# Patient Record
Sex: MALE | Race: AMERICAN INDIAN OR ALASKA NATIVE
[De-identification: names, ages, dates, MRNs, and addresses within clinical notes are randomized per-mention and may not be internally consistent; named-entity substitution may affect disease eponyms.]

---

## 2018-03-25 ENCOUNTER — HOSPITAL ENCOUNTER (EMERGENCY)
Dept: HOSPITAL 42 - ED | Age: 18
Discharge: HOME | End: 2018-03-25
Payer: MEDICAID

## 2018-03-25 VITALS
SYSTOLIC BLOOD PRESSURE: 122 MMHG | DIASTOLIC BLOOD PRESSURE: 78 MMHG | OXYGEN SATURATION: 99 % | RESPIRATION RATE: 18 BRPM | HEART RATE: 78 BPM

## 2018-03-25 VITALS — TEMPERATURE: 98.8 F
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
## 2018-03-25 DIAGNOSIS — S01.511A: Primary | ICD-10-CM
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
## 2018-06-24 ENCOUNTER — HOSPITAL ENCOUNTER (INPATIENT)
Dept: HOSPITAL 42 - ED | Age: 18
LOS: 4 days | Discharge: HOME | DRG: 560 | End: 2018-06-28
Attending: INTERNAL MEDICINE | Admitting: INTERNAL MEDICINE
Payer: COMMERCIAL

## 2018-06-24 VITALS — BODY MASS INDEX: 21.1 KG/M2

## 2018-06-24 DIAGNOSIS — I31.9: ICD-10-CM

## 2018-06-24 DIAGNOSIS — K59.00: ICD-10-CM

## 2018-06-24 DIAGNOSIS — F10.129: ICD-10-CM

## 2018-06-24 DIAGNOSIS — E87.0: ICD-10-CM

## 2018-06-24 DIAGNOSIS — F11.10: ICD-10-CM

## 2018-06-24 DIAGNOSIS — M62.82: ICD-10-CM
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
## 2018-06-24 DIAGNOSIS — S73.004A: Primary | ICD-10-CM

## 2018-06-24 DIAGNOSIS — S00.81XA: ICD-10-CM

## 2018-06-24 DIAGNOSIS — D64.9: ICD-10-CM
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
## 2018-06-24 DIAGNOSIS — F12.10: ICD-10-CM
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
## 2018-06-24 LAB
ALBUMIN SERPL-MCNC: 4.8 G/DL (ref 3–4.8)
ALBUMIN/GLOB SERPL: 1.5 {RATIO} (ref 1.1–1.8)
ALT SERPL-CCNC: 30 U/L (ref 7–56)
APAP SERPL-MCNC: < 10 UG/ML (ref 10–20)
APPEARANCE UR: CLEAR
APTT BLD: 27.6 SECONDS (ref 25.1–36.5)
AST SERPL-CCNC: 35 U/L (ref 17–59)
BACTERIA #/AREA URNS HPF: (no result) /[HPF]
BASOPHILS # BLD AUTO: 0.03 K/MM3 (ref 0–2)
BASOPHILS NFR BLD: 0.4 % (ref 0–3)
BILIRUB UR-MCNC: NEGATIVE MG/DL
BUN SERPL-MCNC: 13 MG/DL (ref 7–21)
BUN SERPL-MCNC: 13 MG/DL (ref 7–21)
CALCIUM SERPL-MCNC: 8.6 MG/DL (ref 8.4–10.5)
CALCIUM SERPL-MCNC: 9.3 MG/DL (ref 8.4–10.5)
CK MB SERPL-MCNC: 2.6 NG/ML (ref 0–3.6)
COLOR UR: (no result)
EOSINOPHIL # BLD: 0.1 10*3/UL (ref 0–0.7)
EOSINOPHIL NFR BLD: 0.8 % (ref 1.5–5)
EPI CELLS #/AREA URNS HPF: (no result) /HPF (ref 0–5)
ERYTHROCYTE [DISTWIDTH] IN BLOOD BY AUTOMATED COUNT: 13 % (ref 11.5–14.5)
GFR NON-AFRICAN AMERICAN: > 60
GFR NON-AFRICAN AMERICAN: > 60
GLUCOSE UR STRIP-MCNC: NEGATIVE MG/DL
GRANULOCYTES # BLD: 3.47 10*3/UL (ref 1.4–6.5)
GRANULOCYTES NFR BLD: 44.2 % (ref 50–68)
HGB BLD-MCNC: 14.3 G/DL (ref 14–18)
INR PPP: 1.12 (ref 0.93–1.08)
LEUKOCYTE ESTERASE UR-ACNC: NEGATIVE LEU/UL
LYMPHOCYTES # BLD: 4 10*3/UL (ref 1.2–3.4)
LYMPHOCYTES NFR BLD AUTO: 50.7 % (ref 22–35)
MCH RBC QN AUTO: 31 PG (ref 25–35)
MCHC RBC AUTO-ENTMCNC: 34.9 G/DL (ref 31–37)
MCV RBC AUTO: 88.9 FL (ref 80–105)
MONOCYTES # BLD AUTO: 0.3 10*3/UL (ref 0.1–0.6)
MONOCYTES NFR BLD: 3.9 % (ref 1–6)
PH UR STRIP: 6.5 [PH] (ref 4.7–8)
PLATELET # BLD: 246 10^3/UL (ref 120–450)
PMV BLD AUTO: 9.1 FL (ref 7–11)
PROT UR STRIP-MCNC: NEGATIVE MG/DL
PROTHROMBIN TIME: 12.9 SECONDS (ref 9.4–12.5)
RBC # BLD AUTO: 4.61 10^6/UL (ref 3.5–6.1)
RBC # UR STRIP: (no result) /UL
RBC #/AREA URNS HPF: (no result) /HPF (ref 0–2)
SALICYLATES SERPL-MCNC: < 1 MG/DL (ref 2–20)
SP GR UR STRIP: <= 1.005 (ref 1–1.03)
TROPONIN I SERPL-MCNC: < 0.01 NG/ML
TROPONIN I SERPL-MCNC: < 0.01 NG/ML
UROBILINOGEN UR STRIP-ACNC: 0.2 E.U./DL
WBC # BLD AUTO: 7.9 10^3/UL (ref 4.5–11)
WBC #/AREA URNS HPF: (no result) /HPF (ref 0–6)

## 2018-06-24 PROCEDURE — 0QS6XZZ REPOSITION RIGHT UPPER FEMUR, EXTERNAL APPROACH: ICD-10-PCS

## 2018-06-24 RX ADMIN — BACITRACIN ZINC NEOMYCIN SULFATE POLYMYXIN B SULFATE SCH APPLIC: 400; 3.5; 5 OINTMENT TOPICAL at 17:50

## 2018-06-24 RX ADMIN — OXYCODONE HYDROCHLORIDE AND ACETAMINOPHEN PRN TAB: 2.5; 325 TABLET ORAL at 22:54

## 2018-06-24 NOTE — RAD
PROCEDURE:  Pelvis 



HISTORY:

Postreduction 



COMPARISON:

Comparison made with prior pelvic radiographs earlier same day



FINDINGS:



BONES:

Interval reduction previously noted dislocated right femoral head 



IMPRESSION:

Status post reduction previously noted dislocated right femoral head

## 2018-06-24 NOTE — CT
PROCEDURE:  CT of the Right Hip.



HISTORY:

Right hip injury







COMPARISON:

None available.



TECHNIQUE:

Contiguous axial images of the right hip were obtained. Coronal and 

sagittal reformats were generated.



This CT exam was performed using one or more of the following dose 

reduction techniques: Automated exposure control, adjustment of the 

mA and/or kV according to patient size, and/or use of iterative 

reconstruction technique.



Radiation dose: Total DLP = 193.52 mGy-cm 



FINDINGS:



BONES:

The femoral head is posterior superior and somewhat laterally 

dislocated with respect to the right acetabulum. The femoral head 

appears to be rotated with the greater trochanter anteriorly 

oriented. There are several tiny bony fragments seen adjacent to the 

posterior margin of the acetabulum and anterior to the femoral head 

likely arising posterior margin of the acetabular rim and possibly a 

santy or two of bone from the femoral head. .  



RIGHT HIP JOINT:

As above



SOFT TISSUES:

There appears to be some hyperdense material possibly hemorrhagic 

fluid within the acetabulum.  A small amount of vacuum phenomena is 

also present. 



IMPRESSION:

The femoral head is posterior superior and somewhat laterally 

dislocated with respect to the right acetabulum. The femoral head 

appears to be rotated with the greater trochanter anteriorly 

oriented. There are several tiny bony fragments seen adjacent to the 

posterior margin of the acetabulum and anterior to the femoral head 

likely arising posterior margin of the acetabular rim and possibly a 

santy or two of bone from the femoral head

## 2018-06-24 NOTE — CT
PROCEDURE:  CT Chest, Abdomen and Pelvis with intravenous contrast



HISTORY:

Trauma 



COMPARISON:

None.



TECHNIQUE:

IV dose administered: 150 cc Omnipaque 350 



Radiation dose:



Total exam DLP = 491.34 mGy-cm.



This CT exam was performed using one or more of the following dose 

reduction techniques: Automated exposure control, adjustment of the 

mA and/or kV according to patient size, and/or use of iterative 

reconstruction technique. . . 



Note that the examination is limited due to patient rotation and side 

bending limiting evaluation. 



FINDINGS:



CT CHEST WITH CONTRAST:



LUNGS:

There are no focal areas of consolidation.  No evidence of masses or 

nodules.



MEDIASTINUM:

Heart size normal. No evidence of pericardial effusion. Ascending 

thoracic aorta measures approximately 2.4 cm and descending thoracic 

aorta measures approximately 1.9 cm. No evidence of aortic 

dissection. Three-vessel arch. 



Pulmonary trunk measures approximately 2.3 cm. Central airways 

midline and patent. No evidence of large central endoluminal lesions. 

. 



LYMPH NODES:

No significant mediastinal or hilar adenopathy.



PLEURA:

No significant pleural effusion or evidence of pneumothorax.



BONES:

Unremarkable.



OTHER FINDINGS:

None.



CT ABDOMEN AND PELVIS:



LIVER:

Liver appears intact and exhibits normal size.  Moderate fatty 

hepatic infiltration. .  No obvious hepatic mass or collection.  

Portal and splenic veins are opacified. 



GALLBLADDER AND BILE DUCTS:

Gallbladder physiologically distended. No evidence of intraluminal 

gallbladder calculi. . 



PANCREAS:

Unremarkable. No gross lesion or ductal dilatation.



SPLEEN:

Spleen appears intact. . 



ADRENALS:

There are no adrenal lesions. 



KIDNEYS AND URETERS:

Kidneys that demonstrate symmetric nephrograms. Kidneys appear 

intact. No evidence of nephrolithiasis or hydronephrosis. 



VASCULATURE:

Unremarkable. No aortic aneurysm. 



BOWEL:

Evaluation of the bowel is somewhat limited due to the lack of oral 

contrast material. Additionally, study is further limited by multiple 

collapsed loops of small bowel. What is felt to represent several of 

small fluid-filled loops of small bowel within the pelvis are 

present. This is not felt to represent free intraperitoneal fluid or 

hemorrhage with Hounsfield units in the low 20s the likely 

representing sock mesenteric this. . 



Most of the large bowel is also relatively collapsed therefore 

difficult to evaluate. 



APPENDIX:

Appendix is not seen with any certainty on this exam. 



PERITONEUM:

Unremarkable. No free fluid. No free air. 



LYMPH NODES:

Unremarkable. No enlarged lymph nodes. 



BLADDER:

Urinary bladder physiologically distended.  No evidence of 

intraluminal urinary bladder calculi. 



REPRODUCTIVE:

Unremarkable. 



BONES:

Re- demonstrated is dislocation of the right femoral head which has 

been described in detail on concurrent CT scan of the right. Several 

tiny bone fragments of the bulk of which arise presumably from the 

posterior margin of the acetabular rim and possibly a flat vertebral 

bone from the femoral head itself.  Please refer to concurrent CT 

scan of the hip corresponding report for additional details 



The remaining osseous structures appear intact. 



OTHER FINDINGS:

None.



IMPRESSION:

No acute intra abdominal or intrathoracic posttraumatic sequela. 



Dislocation right hip as detailed above. Please refer to concurrent 

CT scan of the right hip and corresponding report for additional 

details.

## 2018-06-24 NOTE — CT
PROCEDURE:  CT Cervical Spine without contrast



HISTORY:

trauma



COMPARISON:

None available.



TECHNIQUE:

Axial computed tomography images were obtained of the cervical spine 

without the use of intravenous contrast. Coronal and sagittal 

reformatted images were created and reviewed. Note that the 

examination is limited by side bending to the left with component of 

of rotation of the head to the left as well. 



Radiation dose: 



Total exam DLP = the mGy-cm.



This CT exam was performed using one or more of the following dose 

reduction techniques: Automated exposure control, adjustment of the 

mA and/or kV according to patient size, and/or use of iterative 

reconstruction technique.



FINDINGS:



VERTEBRAE:

No fracture. Normal alignment. No destructive bony lesion.



DISCS/SPINAL CANAL/NEURAL FORAMINA:

No significant central canal or neural foraminal stenosis. Discs 

heights are grossly preserved.



PARASPINAL SOFT TISSUES:

Unremarkable. 



OTHER FINDINGS:

None.



IMPRESSION:

No evidence of acute displaced or compression fracture deformities.



No disc herniation or significant disc bulges. Central canal and exit 

foramina adequate.

## 2018-06-24 NOTE — CT
PROCEDURE:  CT HEAD WITHOUT CONTRAST.



HISTORY:

Trauma 



COMPARISON:

Comparison made with CT scan of the brain dated 03/25/2018. 



TECHNIQUE:

Axial computed tomography images were obtained through the head/brain 

without intravenous contrast.  



Radiation dose:



Total exam DLP = 989.44 mGy-cm.



This CT exam was performed using one or more of the following dose 

reduction techniques: Automated exposure control, adjustment of the 

mA and/or kV according to patient size, and/or use of iterative 

reconstruction technique.



FINDINGS:



HEMORRHAGE:

No intracranial hemorrhage. 



BRAIN:

No mass effect or edema.  No atrophy or chronic microvascular 

ischemic changes.



VENTRICLES:

Unremarkable. No hydrocephalus. 



CALVARIUM:

Unremarkable. Mild right temporoparietal and frontal scalp swelling. 



PARANASAL SINUSES:

There is a medium to large size mucous retention cyst right maxillary 

antrum the the cup



MASTOID AIR CELLS:

Unremarkable as visualized. No inflammatory changes.



OTHER FINDINGS:

None.



IMPRESSION:

Normal CT of the Head.

## 2018-06-24 NOTE — CARD
--------------- APPROVED REPORT --------------





EKG Measurement

Heart [id number]

[id number]

[id number]

[id number]

[id number]



<Conclusion>

Normal sinus rhythm

Rightward axis

ST elevation, consider 

Early Repolarization

Vs

Early Pericarditis

Abnormal ECG

## 2018-06-24 NOTE — RAD
PROCEDURE:  Pelvis right



HISTORY:

hip injury



COMPARISON:

Correlation made with concurrent CT scan of the pelvis



TECHNIQUE:

Two views of the pelvis performed



FINDINGS:

There is posterior dislocation of the right femoral head with respect 

to the acetabulum small bony fracture fragments seen on CT scan not 

appreciated on this study please refer to that report for additional 

information. 



IMPRESSION:

Posterior dislocation right femoral head as above.  Fracture 

fragments not appreciated on this study compared concurrent CT scan 

of the pelvis
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
## 2018-06-24 NOTE — RAD
HISTORY:

trauma  



COMPARISON:

None



FINDINGS:



LUNGS:

No active pulmonary disease.



PLEURA:

No significant pleural effusion identified, no pneumothorax apparent.



CARDIOVASCULAR:

Normal.



OSSEOUS STRUCTURES:

No significant abnormalities.



VISUALIZED UPPER ABDOMEN:

Normal.



OTHER FINDINGS:

None.



IMPRESSION:

No active disease.
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
## 2018-06-25 LAB
BASOPHILS # BLD AUTO: 0.01 K/MM3 (ref 0–2)
BASOPHILS NFR BLD: 0.1 % (ref 0–3)
BUN SERPL-MCNC: 13 MG/DL (ref 7–21)
CALCIUM SERPL-MCNC: 8.8 MG/DL (ref 8.4–10.5)
CK MB SERPL-MCNC: 22.3 NG/ML (ref 0–3.6)
CK MB%: 0.4 % (ref 2.5–3)
EOSINOPHIL # BLD: 0.3 10*3/UL (ref 0–0.7)
EOSINOPHIL NFR BLD: 3.6 % (ref 1.5–5)
ERYTHROCYTE [DISTWIDTH] IN BLOOD BY AUTOMATED COUNT: 13.3 % (ref 11.5–14.5)
GFR NON-AFRICAN AMERICAN: > 60
GRANULOCYTES # BLD: 3.31 10*3/UL (ref 1.4–6.5)
GRANULOCYTES NFR BLD: 47.1 % (ref 50–68)
HGB BLD-MCNC: 12.1 G/DL (ref 14–18)
LYMPHOCYTES # BLD: 2.6 10*3/UL (ref 1.2–3.4)
LYMPHOCYTES NFR BLD AUTO: 37.4 % (ref 22–35)
MCH RBC QN AUTO: 30.4 PG (ref 25–35)
MCHC RBC AUTO-ENTMCNC: 34.1 G/DL (ref 31–37)
MCV RBC AUTO: 89.2 FL (ref 80–105)
MONOCYTES # BLD AUTO: 0.8 10*3/UL (ref 0.1–0.6)
MONOCYTES NFR BLD: 11.8 % (ref 1–6)
PLATELET # BLD: 193 10^3/UL (ref 120–450)
PMV BLD AUTO: 9.8 FL (ref 7–11)
RBC # BLD AUTO: 3.98 10^6/UL (ref 3.5–6.1)
WBC # BLD AUTO: 7 10^3/UL (ref 4.5–11)

## 2018-06-25 RX ADMIN — OXYCODONE HYDROCHLORIDE AND ACETAMINOPHEN PRN TAB: 2.5; 325 TABLET ORAL at 08:23

## 2018-06-25 RX ADMIN — OXYCODONE HYDROCHLORIDE AND ACETAMINOPHEN PRN TAB: 2.5; 325 TABLET ORAL at 21:50

## 2018-06-25 RX ADMIN — BACITRACIN ZINC NEOMYCIN SULFATE POLYMYXIN B SULFATE SCH APPLIC: 400; 3.5; 5 OINTMENT TOPICAL at 10:29

## 2018-06-25 RX ADMIN — OXYCODONE HYDROCHLORIDE AND ACETAMINOPHEN PRN TAB: 2.5; 325 TABLET ORAL at 15:23
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
## 2018-06-25 NOTE — CARD
--------------- APPROVED REPORT --------------





EKG Measurement

Heart [id number]

PA [id number]

[id number]

[id number]

[id number]



<Conclusion>

Normal sinus rhythm

Voltage criteria for left ventricular hypertrophy

Early repolarization

Abnormal ECG
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
## 2018-06-25 NOTE — CARD
--------------- APPROVED REPORT --------------





EXAM: Two-dimensional and M-mode echocardiogram with Doppler and 

color Doppler.



INDICATION

Abnormal EKG/Arrhythmia 



2D DIMENSIONS 

Left Atrium (2D)3.9   (1.6-4.0cm)IVSd1.1   (0.7-1.1cm)

LVDd4.3   (3.9-5.9cm)PWd1.2   (0.7-1.1cm)

LVDs2.7   (2.5-4.0cm)FS (%) 37.4   %

LVEF (%)67.8   (>50%)



M-Mode DIMENSIONS 

Aortic Root2.30   (2.2-3.7cm)Aortic Cusp Exc.1.80   (1.5-2.0cm)



Aortic Valve

AoV Peak Gwhfdito190.0cm/Dylon Peak GR.9mmHg



Mitral Valve

MV E Bobigsbr93.3cm/sMV A Ubpcatjs25.2cm/sE/A ratio1.9



TDI

E/Lateral E'0.0E/Medial E'0.0



Tricuspid Valve

TR Peak Pcjqnmgj805is/sRAP [id number] Peak Gr.20mmHg

[id number]



 LEFT VENTRICLE 

The left ventricle is normal size. There is normal left ventricular 

wall thickness. The left ventricular function is normal. The left 

ventricular ejection fraction is within the normal range. There is 

normal LV segmental wall motion. The left ventricular diastolic 

function is normal.



 RIGHT VENTRICLE 

The right ventricle is normal size. There is normal right ventricular 

wall thickness. The right ventricular systolic function is normal.



 ATRIA 

The left atrium size is normal. The right atrium size is normal.



 AORTIC VALVE 

The aortic valve is normal in structure. No aortic regurgitation is 

present. There is no aortic valvular stenosis. 



 MITRAL VALVE 

The mitral valve is normal in structure. There is no mitral valve 

regurgitation noted. There is no mitral valve stenosis.



 TRICUSPID VALVE 

There is mild tricuspid regurgitation.



 GREAT VESSELS 

The aortic root is normal in size. The IVC is normal in size and 

collapses >50% with inspiration.



<Conclusion>

The left ventricle is normal size.

There is normal left ventricular wall thickness.

The left ventricular function is normal.

The left ventricular ejection fraction is within the normal range.

There is normal LV segmental wall motion.

The left ventricular diastolic function is normal.

There is mild tricuspid regurgitation.
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
## 2018-06-26 LAB
APPEARANCE UR: CLEAR
BASOPHILS # BLD AUTO: 0.02 K/MM3 (ref 0–2)
BASOPHILS NFR BLD: 0.3 % (ref 0–3)
BILIRUB UR-MCNC: NEGATIVE MG/DL
BUN SERPL-MCNC: 9 MG/DL (ref 7–21)
CALCIUM SERPL-MCNC: 8.6 MG/DL (ref 8.4–10.5)
CK MB SERPL-MCNC: 6.2 NG/ML (ref 0–3.6)
CK MB%: 0.1 % (ref 2.5–3)
COLOR UR: COLORLESS
EOSINOPHIL # BLD: 0.2 10*3/UL (ref 0–0.7)
EOSINOPHIL NFR BLD: 3.3 % (ref 1.5–5)
ERYTHROCYTE [DISTWIDTH] IN BLOOD BY AUTOMATED COUNT: 13.1 % (ref 11.5–14.5)
GFR NON-AFRICAN AMERICAN: > 60
GLUCOSE UR STRIP-MCNC: NEGATIVE MG/DL
GRANULOCYTES # BLD: 2.27 10*3/UL (ref 1.4–6.5)
GRANULOCYTES NFR BLD: 37.9 % (ref 50–68)
HGB BLD-MCNC: 11.3 G/DL (ref 14–18)
LEUKOCYTE ESTERASE UR-ACNC: NEGATIVE LEU/UL
LYMPHOCYTES # BLD: 2.7 10*3/UL (ref 1.2–3.4)
LYMPHOCYTES NFR BLD AUTO: 45.5 % (ref 22–35)
MCH RBC QN AUTO: 30.1 PG (ref 25–35)
MCHC RBC AUTO-ENTMCNC: 33.4 G/DL (ref 31–37)
MCV RBC AUTO: 89.9 FL (ref 80–105)
MONOCYTES # BLD AUTO: 0.8 10*3/UL (ref 0.1–0.6)
MONOCYTES NFR BLD: 13 % (ref 1–6)
PH UR STRIP: 6.5 [PH] (ref 4.7–8)
PLATELET # BLD: 168 10^3/UL (ref 120–450)
PMV BLD AUTO: 9.8 FL (ref 7–11)
PROT UR STRIP-MCNC: NEGATIVE MG/DL
RBC # BLD AUTO: 3.76 10^6/UL (ref 3.5–6.1)
RBC # UR STRIP: NEGATIVE /UL
SP GR UR STRIP: <= 1.005 (ref 1–1.03)
UROBILINOGEN UR STRIP-ACNC: 0.2 E.U./DL
WBC # BLD AUTO: 6 10^3/UL (ref 4.5–11)

## 2018-06-26 RX ADMIN — OXYCODONE HYDROCHLORIDE AND ACETAMINOPHEN PRN TAB: 2.5; 325 TABLET ORAL at 13:53

## 2018-06-26 RX ADMIN — BACITRACIN ZINC NEOMYCIN SULFATE POLYMYXIN B SULFATE SCH APPLIC: 400; 3.5; 5 OINTMENT TOPICAL at 10:29
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
## 2018-06-27 VITALS — OXYGEN SATURATION: 98 %

## 2018-06-27 LAB
ALBUMIN SERPL-MCNC: 3.4 G/DL (ref 3–4.8)
ALBUMIN/GLOB SERPL: 1.2 {RATIO} (ref 1.1–1.8)
ALT SERPL-CCNC: 48 U/L (ref 7–56)
AST SERPL-CCNC: 67 U/L (ref 17–59)
BASOPHILS # BLD AUTO: 0.01 K/MM3 (ref 0–2)
BASOPHILS NFR BLD: 0.2 % (ref 0–3)
BUN SERPL-MCNC: 7 MG/DL (ref 7–21)
CALCIUM SERPL-MCNC: 9.1 MG/DL (ref 8.4–10.5)
CK MB SERPL-MCNC: 2 NG/ML (ref 0–3.6)
EOSINOPHIL # BLD: 0.2 10*3/UL (ref 0–0.7)
EOSINOPHIL NFR BLD: 2.8 % (ref 1.5–5)
ERYTHROCYTE [DISTWIDTH] IN BLOOD BY AUTOMATED COUNT: 12.9 % (ref 11.5–14.5)
GFR NON-AFRICAN AMERICAN: > 60
GRANULOCYTES # BLD: 2.88 10*3/UL (ref 1.4–6.5)
GRANULOCYTES NFR BLD: 53 % (ref 50–68)
HGB BLD-MCNC: 12.8 G/DL (ref 14–18)
LYMPHOCYTES # BLD: 1.9 10*3/UL (ref 1.2–3.4)
LYMPHOCYTES NFR BLD AUTO: 34.8 % (ref 22–35)
MCH RBC QN AUTO: 30.8 PG (ref 25–35)
MCHC RBC AUTO-ENTMCNC: 34.7 G/DL (ref 31–37)
MCV RBC AUTO: 88.9 FL (ref 80–105)
MONOCYTES # BLD AUTO: 0.5 10*3/UL (ref 0.1–0.6)
MONOCYTES NFR BLD: 9.2 % (ref 1–6)
PLATELET # BLD: 169 10^3/UL (ref 120–450)
PMV BLD AUTO: 9.9 FL (ref 7–11)
RBC # BLD AUTO: 4.15 10^6/UL (ref 3.5–6.1)
WBC # BLD AUTO: 5.4 10^3/UL (ref 4.5–11)

## 2018-06-27 RX ADMIN — BACITRACIN ZINC NEOMYCIN SULFATE POLYMYXIN B SULFATE SCH APPLIC: 400; 3.5; 5 OINTMENT TOPICAL at 09:51

## 2018-06-27 RX ADMIN — OXYCODONE HYDROCHLORIDE AND ACETAMINOPHEN PRN TAB: 2.5; 325 TABLET ORAL at 20:20
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
## 2018-06-28 VITALS
SYSTOLIC BLOOD PRESSURE: 111 MMHG | HEART RATE: 52 BPM | DIASTOLIC BLOOD PRESSURE: 65 MMHG | RESPIRATION RATE: 20 BRPM | TEMPERATURE: 97.8 F

## 2018-06-28 LAB
ALBUMIN SERPL-MCNC: 2.9 G/DL (ref 3–4.8)
ALBUMIN/GLOB SERPL: 1.1 {RATIO} (ref 1.1–1.8)
ALT SERPL-CCNC: 37 U/L (ref 7–56)
AST SERPL-CCNC: 42 U/L (ref 17–59)
BASOPHILS # BLD AUTO: 0.01 K/MM3 (ref 0–2)
BASOPHILS NFR BLD: 0.2 % (ref 0–3)
BUN SERPL-MCNC: 7 MG/DL (ref 7–21)
CALCIUM SERPL-MCNC: 9 MG/DL (ref 8.4–10.5)
CK MB SERPL-MCNC: 0.9 NG/ML (ref 0–3.6)
EOSINOPHIL # BLD: 0.3 10*3/UL (ref 0–0.7)
EOSINOPHIL NFR BLD: 4.8 % (ref 1.5–5)
ERYTHROCYTE [DISTWIDTH] IN BLOOD BY AUTOMATED COUNT: 12.8 % (ref 11.5–14.5)
GFR NON-AFRICAN AMERICAN: > 60
GRANULOCYTES # BLD: 3.06 10*3/UL (ref 1.4–6.5)
GRANULOCYTES NFR BLD: 52 % (ref 50–68)
HGB BLD-MCNC: 11.7 G/DL (ref 14–18)
LYMPHOCYTES # BLD: 2 10*3/UL (ref 1.2–3.4)
LYMPHOCYTES NFR BLD AUTO: 33.3 % (ref 22–35)
MCH RBC QN AUTO: 30.8 PG (ref 25–35)
MCHC RBC AUTO-ENTMCNC: 34.4 G/DL (ref 31–37)
MCV RBC AUTO: 89.5 FL (ref 80–105)
MONOCYTES # BLD AUTO: 0.6 10*3/UL (ref 0.1–0.6)
MONOCYTES NFR BLD: 9.7 % (ref 1–6)
PLATELET # BLD: 152 10^3/UL (ref 120–450)
PMV BLD AUTO: 9.5 FL (ref 7–11)
RBC # BLD AUTO: 3.8 10^6/UL (ref 3.5–6.1)
WBC # BLD AUTO: 5.9 10^3/UL (ref 4.5–11)

## 2018-06-28 RX ADMIN — OXYCODONE HYDROCHLORIDE AND ACETAMINOPHEN PRN TAB: 2.5; 325 TABLET ORAL at 06:03

## 2018-06-28 RX ADMIN — POLYETHYLENE GLYCOL 3350 SCH GM: 17 POWDER, FOR SOLUTION ORAL at 09:51

## 2018-06-28 RX ADMIN — POLYETHYLENE GLYCOL 3350 SCH: 17 POWDER, FOR SOLUTION ORAL at 17:45

## 2018-06-28 RX ADMIN — BACITRACIN ZINC NEOMYCIN SULFATE POLYMYXIN B SULFATE SCH APPLIC: 400; 3.5; 5 OINTMENT TOPICAL at 09:52

## 2018-06-28 RX ADMIN — OXYCODONE HYDROCHLORIDE AND ACETAMINOPHEN PRN TAB: 2.5; 325 TABLET ORAL at 09:50
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
## 2018-06-28 NOTE — RAD
HISTORY:

constipation, abdominal pain  



COMPARISON:

No prior.



FINDINGS:



BOWEL:

Nonobstructive bowel gas pattern. No abnormal intra-abdominal 

calcifications or prominent free intraperitoneal gas. 



BONES:

Normal.



OTHER FINDINGS:

None.



IMPRESSION:

Unremarkable abdomen KUB radiograph.
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
## 2018-09-05 ENCOUNTER — HOSPITAL ENCOUNTER (EMERGENCY)
Dept: HOSPITAL 42 - ED | Age: 18
Discharge: HOME | End: 2018-09-05
Payer: MEDICAID

## 2018-09-05 VITALS — OXYGEN SATURATION: 99 % | HEART RATE: 64 BPM | DIASTOLIC BLOOD PRESSURE: 72 MMHG | SYSTOLIC BLOOD PRESSURE: 109 MMHG

## 2018-09-05 VITALS — BODY MASS INDEX: 20.9 KG/M2

## 2018-09-05 VITALS — TEMPERATURE: 97.6 F | RESPIRATION RATE: 18 BRPM

## 2018-09-05 DIAGNOSIS — J02.9: Primary | ICD-10-CM

## 2018-09-05 DIAGNOSIS — F17.210: ICD-10-CM

## 2018-09-05 DIAGNOSIS — R59.0: ICD-10-CM

## 2018-09-05 LAB
ALBUMIN SERPL-MCNC: 4.5 G/DL (ref 3–4.8)
ALBUMIN/GLOB SERPL: 1.2 {RATIO} (ref 1.1–1.8)
ALT SERPL-CCNC: 28 U/L (ref 7–56)
AST SERPL-CCNC: 35 U/L (ref 17–59)
BASOPHILS # BLD AUTO: 0.02 K/MM3 (ref 0–2)
BASOPHILS NFR BLD: 0.2 % (ref 0–3)
BUN SERPL-MCNC: 11 MG/DL (ref 7–21)
CALCIUM SERPL-MCNC: 9.5 MG/DL (ref 8.4–10.5)
CK MB SERPL-MCNC: 0.4 NG/ML (ref 0–3.6)
EOSINOPHIL # BLD: 0 10*3/UL (ref 0–0.7)
EOSINOPHIL NFR BLD: 0.2 % (ref 1.5–5)
ERYTHROCYTE [DISTWIDTH] IN BLOOD BY AUTOMATED COUNT: 13.2 % (ref 11.5–14.5)
GFR NON-AFRICAN AMERICAN: > 60
GRANULOCYTES # BLD: 5.49 10*3/UL (ref 1.4–6.5)
GRANULOCYTES NFR BLD: 65.5 % (ref 50–68)
HGB BLD-MCNC: 15.3 G/DL (ref 14–18)
LYMPHOCYTES # BLD: 1.4 10*3/UL (ref 1.2–3.4)
LYMPHOCYTES NFR BLD AUTO: 17 % (ref 22–35)
MCH RBC QN AUTO: 30.5 PG (ref 25–35)
MCHC RBC AUTO-ENTMCNC: 34 G/DL (ref 31–37)
MCV RBC AUTO: 89.6 FL (ref 80–105)
MONOCYTES # BLD AUTO: 1.4 10*3/UL (ref 0.1–0.6)
MONOCYTES NFR BLD: 17.1 % (ref 1–6)
PLATELET # BLD: 191 10^3/UL (ref 120–450)
PMV BLD AUTO: 9.6 FL (ref 7–11)
RBC # BLD AUTO: 5.02 10^6/UL (ref 3.5–6.1)
WBC # BLD AUTO: 8.4 10^3/UL (ref 4.5–11)

## 2018-09-05 PROCEDURE — 96361 HYDRATE IV INFUSION ADD-ON: CPT

## 2018-09-05 PROCEDURE — 82550 ASSAY OF CK (CPK): CPT

## 2018-09-05 PROCEDURE — 83735 ASSAY OF MAGNESIUM: CPT

## 2018-09-05 PROCEDURE — 96375 TX/PRO/DX INJ NEW DRUG ADDON: CPT

## 2018-09-05 PROCEDURE — 96374 THER/PROPH/DIAG INJ IV PUSH: CPT

## 2018-09-05 PROCEDURE — 87430 STREP A AG IA: CPT

## 2018-09-05 PROCEDURE — 70450 CT HEAD/BRAIN W/O DYE: CPT

## 2018-09-05 PROCEDURE — 99284 EMERGENCY DEPT VISIT MOD MDM: CPT

## 2018-09-05 PROCEDURE — 87070 CULTURE OTHR SPECIMN AEROBIC: CPT

## 2018-09-05 PROCEDURE — 80053 COMPREHEN METABOLIC PANEL: CPT

## 2018-09-05 PROCEDURE — 82553 CREATINE MB FRACTION: CPT

## 2018-09-05 PROCEDURE — 85025 COMPLETE CBC W/AUTO DIFF WBC: CPT

## 2018-09-05 NOTE — CT
Date of service: 



09/05/2018



PROCEDURE:  CT HEAD WITHOUT CONTRAST.



HISTORY:

frontal headache x 4 days,



COMPARISON:

None available.



TECHNIQUE:

Axial computed tomography images were obtained through the head/brain 

without intravenous contrast.  



Radiation dose:



Total exam DLP = 840 mGy-cm.



This CT exam was performed using one or more of the following dose 

reduction techniques: Automated exposure control, adjustment of the 

mA and/or kV according to patient size, and/or use of iterative 

reconstruction technique.



FINDINGS:



HEMORRHAGE:

No intracranial hemorrhage. 



BRAIN:

No mass effect or edema.  No atrophy or chronic microvascular 

ischemic changes.



VENTRICLES:

Unremarkable. No hydrocephalus. 



CALVARIUM:

Unremarkable.



PARANASAL SINUSES:

Unremarkable as visualized. No significant inflammatory changes.



MASTOID AIR CELLS:

Unremarkable as visualized. No inflammatory changes.



OTHER FINDINGS:

None.



IMPRESSION:

No acute findings
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
## 2018-10-31 ENCOUNTER — HOSPITAL ENCOUNTER (EMERGENCY)
Dept: HOSPITAL 42 - ED | Age: 18
Discharge: HOME | End: 2018-10-31
Payer: MEDICAID

## 2018-10-31 VITALS
DIASTOLIC BLOOD PRESSURE: 79 MMHG | TEMPERATURE: 98.3 F | SYSTOLIC BLOOD PRESSURE: 132 MMHG | OXYGEN SATURATION: 98 % | HEART RATE: 72 BPM

## 2018-10-31 VITALS — BODY MASS INDEX: 20.9 KG/M2

## 2018-10-31 VITALS — RESPIRATION RATE: 18 BRPM

## 2018-10-31 DIAGNOSIS — M77.8: Primary | ICD-10-CM
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
## 2018-10-31 NOTE — RAD
Date of service: 



10/31/2018



PROCEDURE:  Right Wrist Radiographs.







HISTORY:

medial wrist pain



COMPARISON:

None.



FINDINGS:



BONES:

Normal. No fracture.



JOINTS:

Normal. No dislocation. 



SOFT TISSUES:

Normal. 



OTHER FINDINGS:

None.



IMPRESSION:

Normal right wrist radiographs.

## 2019-04-28 ENCOUNTER — HOSPITAL ENCOUNTER (EMERGENCY)
Dept: HOSPITAL 42 - ED | Age: 19
Discharge: HOME | End: 2019-04-28
Payer: MEDICAID

## 2019-04-28 VITALS — RESPIRATION RATE: 16 BRPM

## 2019-04-28 VITALS — TEMPERATURE: 98.6 F

## 2019-04-28 VITALS — DIASTOLIC BLOOD PRESSURE: 56 MMHG | SYSTOLIC BLOOD PRESSURE: 134 MMHG | OXYGEN SATURATION: 99 % | HEART RATE: 62 BPM

## 2019-04-28 VITALS — BODY MASS INDEX: 20.9 KG/M2

## 2019-04-28 DIAGNOSIS — S01.511A: Primary | ICD-10-CM
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
## 2019-04-28 NOTE — CT
Date of service: 



04/28/2019



PROCEDURE:  CT HEAD WITHOUT CONTRAST.



HISTORY:

fall



COMPARISON:

CT head dated 09/05/2018.



TECHNIQUE:

Axial computed tomography images were obtained through the head/brain 

without intravenous contrast.  



Radiation dose:



Total exam DLP = 885.31 mGy-cm.



This CT exam was performed using one or more of the following dose 

reduction techniques: Automated exposure control, adjustment of the 

mA and/or kV according to patient size, and/or use of iterative 

reconstruction technique.



FINDINGS:



HEMORRHAGE:

No intracranial hemorrhage. 



BRAIN:

No mass effect or edema.  No atrophy or chronic microvascular 

ischemic changes.



VENTRICLES:

Unremarkable. No hydrocephalus. 



CALVARIUM:

Unremarkable.



PARANASAL SINUSES:

Unremarkable as visualized. No significant inflammatory changes.



MASTOID AIR CELLS:

Unremarkable as visualized. No inflammatory changes.



OTHER FINDINGS:

None.



IMPRESSION:

No acute intracranial pathology.
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
